# Patient Record
Sex: MALE | Race: WHITE
[De-identification: names, ages, dates, MRNs, and addresses within clinical notes are randomized per-mention and may not be internally consistent; named-entity substitution may affect disease eponyms.]

---

## 2019-06-23 ENCOUNTER — HOSPITAL ENCOUNTER (EMERGENCY)
Dept: HOSPITAL 11 - JP.ED | Age: 74
Discharge: NURSING, REHAB OR LONG-TERM CARE | End: 2019-06-23
Payer: MEDICARE

## 2019-06-23 DIAGNOSIS — I24.9: Primary | ICD-10-CM

## 2019-06-23 DIAGNOSIS — Z79.02: ICD-10-CM

## 2019-06-23 DIAGNOSIS — Z79.899: ICD-10-CM

## 2019-06-23 DIAGNOSIS — Z79.82: ICD-10-CM

## 2019-06-23 PROCEDURE — 85730 THROMBOPLASTIN TIME PARTIAL: CPT

## 2019-06-23 PROCEDURE — 84484 ASSAY OF TROPONIN QUANT: CPT

## 2019-06-23 PROCEDURE — 96365 THER/PROPH/DIAG IV INF INIT: CPT

## 2019-06-23 PROCEDURE — 93005 ELECTROCARDIOGRAM TRACING: CPT

## 2019-06-23 PROCEDURE — 85025 COMPLETE CBC W/AUTO DIFF WBC: CPT

## 2019-06-23 PROCEDURE — 96376 TX/PRO/DX INJ SAME DRUG ADON: CPT

## 2019-06-23 PROCEDURE — 36415 COLL VENOUS BLD VENIPUNCTURE: CPT

## 2019-06-23 PROCEDURE — 96375 TX/PRO/DX INJ NEW DRUG ADDON: CPT

## 2019-06-23 PROCEDURE — 96368 THER/DIAG CONCURRENT INF: CPT

## 2019-06-23 PROCEDURE — 99284 EMERGENCY DEPT VISIT MOD MDM: CPT

## 2019-06-23 PROCEDURE — 80053 COMPREHEN METABOLIC PANEL: CPT

## 2019-06-23 NOTE — EDM.PDOC
ED HPI GENERAL MEDICAL PROBLEM





- General


Chief Complaint: Chest Pain


Stated Complaint: CHEST PAINS


Time Seen by Provider: 06/23/19 07:54


Source of Information: Reports: Patient


History Limitations: Reports: No Limitations





- History of Present Illness


INITIAL COMMENTS - FREE TEXT/NARRATIVE: 


73-year-old white male comes in with chest pain. He's had 5 stents in the past 

that is 3 or 4 different episodes last time was 2012 at [facility]. For the 

past 2 or 3 days he's been having episodes of chest pain and this morning he 

got out of the shower and it was quite a bit worse. He took 2 nitroglycerin 

tablets on the way in. It decreased the pain a little bit now down to about 6 

out of 10. He feels a little bit short of breath sometimes no nausea no sweats. 

He's anon smoker no significant family history of heart disease. His wife says 

his heart rate is always just a little bit on the low side.





  ** Left Chest


Pain Score (Numeric/FACES): 8





- Related Data


 Allergies











Allergy/AdvReac Type Severity Reaction Status Date / Time


 


No Known Allergies Allergy   Verified 06/23/19 07:49














Past Medical History


Cardiovascular History: Reports: Stents





- Infectious Disease History


Infectious Disease History: Reports: Chicken Pox





Social & Family History





- Tobacco Use


Smoking Status *Q: Unknown Ever Smoked





ED ROS GENERAL





- Review of Systems


Review Of Systems: ROS reveals no pertinent complaints other than HPI.





ED EXAM, GENERAL





- Physical Exam


Exam: See Below


Exam Limited By: No Limitations


General Appearance: Alert, WD/WN, Mild Distress


Eye Exam: Bilateral Eye: Normal Inspection


Throat/Mouth: Normal Inspection


Neck: Normal Inspection


Respiratory/Chest: Lungs Clear


Cardiovascular: Normal Peripheral Pulses, Regular Rate, Rhythm, No Murmur


GI/Abdominal: Non-Tender


Extremities: Normal Inspection, No Pedal Edema


Neurological: Alert, Oriented, Normal Cognition


Psychiatric: Normal Affect


Skin Exam: Warm, Dry





Course





- Vital Signs


Last Recorded V/S: 





 Last Vital Signs











Temp  35.9 C   06/23/19 07:37


 


Pulse  59 L  06/23/19 07:37


 


Resp  16   06/23/19 07:37


 


BP  153/79 H  06/23/19 07:37


 


Pulse Ox  95   06/23/19 07:37














- Orders/Labs/Meds


Orders: 





 Active Orders 24 hr











 Category Date Time Status


 


 EKG Documentation Completion [RC] ASDIRECTED Care  06/23/19 08:00 Active


 


 Heparin Sodium/D5W [Heparin 25,000 Units in D5W 500 ML] Med  06/23/19 08:15 

Active





 25,000 units in 500 ml IV TITRATE   


 


 Nitroglycerin/D5W [Nitroglycerin  25 MG/D5W 250 ML] Med  06/23/19 08:00 Active





 25 mg in 250 ml IV TITRATE   


 


 Sodium Chloride 0.9% [Saline Flush] Med  06/23/19 08:00 Active





 10 ml FLUSH ASDIRECTED PRN   


 


 Saline Lock Insert [OM.PC] Urgent Oth  06/23/19 08:00 Ordered


 


 EKG 12 Lead [EK] Urgent Ther  06/23/19 08:00 Ordered








 Medication Orders





Nitroglycerin/Dextrose (Nitroglycerin  25 Mg/D5w 250 Ml)  25 mg in 250 mls @ 6 

mls/hr IV TITRATE [facility]; Protocol


   Last Admin: 06/23/19 08:01  Dose: 10 mcg/min, 6 mls/hr


Heparin Sodium/Dextrose (Heparin 25,000 Units In D5w 500 Ml)  25,000 units in 

500 mls @ 20 mls/hr IV TITRATE [facility]; Protocol


   Last Admin: 06/23/19 08:22  Dose: 1,000 units/hr, 20 mls/hr


Sodium Chloride (Saline Flush)  10 ml FLUSH ASDIRECTED PRN


   PRN Reason: Keep Vein Open








Labs: 





 Laboratory Tests











  06/23/19 06/23/19 06/23/19 Range/Units





  08:11 08:11 08:11 


 


WBC  7.1    (4.5-11.0)  K/uL


 


RBC  4.75    (4.30-5.90)  M/uL


 


Hgb  15.4 H    (12.0-15.0)  g/dL


 


Hct  44.3    (40.0-54.0)  %


 


MCV  93    (80-98)  fL


 


MCH  32 H    (27-31)  pg


 


MCHC  35    (32-36)  %


 


Plt Count  211    (150-400)  K/uL


 


Neut % (Auto)  70 H    (36-66)  %


 


Lymph % (Auto)  20 L    (24-44)  %


 


Mono % (Auto)  9 H    (2-6)  %


 


Eos % (Auto)  1 L    (2-4)  %


 


Baso % (Auto)  0    (0-1)  %


 


APTT    24.9 L  (27.0-36.0)  sec


 


Sodium   144   (140-148)  mmol/L


 


Potassium   3.9   (3.6-5.2)  mmol/L


 


Chloride   108   (100-108)  mmol/L


 


Carbon Dioxide   25   (21-32)  mmol/L


 


Anion Gap   10.7   (5.0-14.0)  mmol/L


 


BUN   21 H   (7-18)  mg/dL


 


Creatinine   1.0   (0.8-1.3)  mg/dL


 


Est Cr Clr Drug Dosing   68.94   mL/min


 


Estimated GFR (MDRD)   > 60   (>60)  


 


Glucose   108 H   ()  mg/dL


 


Calcium   9.1   (8.5-10.1)  mg/dL


 


Total Bilirubin   0.7   (0.2-1.0)  mg/dL


 


AST   17   (15-37)  U/L


 


ALT   34   (12-78)  U/L


 


Alkaline Phosphatase   58   ()  U/L


 


Troponin I   < 0.017   (0.000-0.056)  ng/mL


 


Total Protein   6.8   (6.4-8.2)  g/dL


 


Albumin   3.9   (3.4-5.0)  g/dL


 


Globulin   2.9   (2.3-3.5)  g/dL


 


Albumin/Globulin Ratio   1.3   (1.2-2.2)  











Meds: 





Medications











Generic Name Dose Route Start Last Admin





  Trade Name Freq  PRN Reason Stop Dose Admin


 


Nitroglycerin/Dextrose  25 mg in 250 mls @ 6 mls/hr  06/23/19 08:00  06/23/19 08

:01





  Nitroglycerin  25 Mg/D5w 250 Ml  IV   10 mcg/min





  TITRATE [facility]   6 mls/hr





     Administration





     





  Protocol   





  10 MCG/MIN   


 


Heparin Sodium/Dextrose  25,000 units in 500 mls @ 20 mls/hr  06/23/19 08:15  06 /23/19 08:22





  Heparin 25,000 Units In D5w 500 Ml  IV   1,000 units/hr





  TITRATE [facility]   20 mls/hr





     Administration





     





  Protocol   





  1,000 UNITS/HR   


 


Sodium Chloride  10 ml  06/23/19 08:00  





  Saline Flush  FLUSH   





  ASDIRECTED PRN   





  Keep Vein Open   





     





     





     














Discontinued Medications














Generic Name Dose Route Start Last Admin





  Trade Name Freq  PRN Reason Stop Dose Admin


 


Aspirin  325 mg  06/23/19 07:48  06/23/19 07:53





  Aspirin  PO  06/23/19 07:49  325 mg





  ONETIME ONE   Administration





     





     





     





     


 


Clopidogrel Bisulfate  600 mg  06/23/19 08:01  06/23/19 08:11





  Plavix  PO  06/23/19 08:02  600 mg





  ONETIME ONE   Administration





     





     





     





     


 


Heparin Sodium (Porcine)  4,000 units  06/23/19 08:01  06/23/19 08:10





  Heparin Sodium  IVPUSH  06/23/19 08:02  4,000 units





  ONETIME ONE   Administration





     





     





     





     


 


Lorazepam  1 mg  06/23/19 08:06  06/23/19 08:13





  Ativan  IVPUSH  06/23/19 08:07  1 mg





  ONETIME ONE   Administration





     





     





     





     


 


Morphine Sulfate  4 mg  06/23/19 07:48  06/23/19 08:01





  Morphine  IVPUSH  06/23/19 07:49  4 mg





  ONETIME ONE   Administration





     





     





     





     


 


Morphine Sulfate  4 mg  06/23/19 08:06  06/23/19 08:14





  Morphine  IVPUSH  06/23/19 08:07  4 mg





  ONETIME ONE   Administration





     





     





     





     


 


Morphine Sulfate  4 mg  06/23/19 08:22  06/23/19 08:26





  Morphine  IVPUSH  06/23/19 08:23  4 mg





  ONETIME ONE   Administration





     





     





     





     


 


Morphine Sulfate  Confirm  06/23/19 08:25  





  Morphine  Administered  06/23/19 08:26  





  Dose   





  4 mg   





  .ROUTE   





  .[facility]   





     





     





     





     














- Re-Assessments/Exams


Free Text/Narrative Re-Assessment/Exam: 





06/23/19 08:40


CP down to 3/10 after 4mg x 3 Morphine. Increasing ntg to 20.  Bp 160's.








06/23/19 0845 An EKG showed a normal sinus rhythm partial right bundle branch 

block. No old EKGs.





We started off with aspirin and a little bit difficulty starting an IV once we 

got it he was given IV morphine and started a nitroglycerin drip. I spoke with 

Dr. Horne at [facility] in [place] and he has accepted the patient will go ahead and 

proceed with heparin bolus and drip and loaded with Plavix 600 mg. At present 

time the patient's pain is as noted above. He's being loaded up for transfer now








Departure





- Departure


Time of Disposition: 08:47


Disposition: DC/Tfer to [facility]


Reason for Transfer *Q: Primary PCI Indicated


Condition: Serious


Clinical Impression: 


 Acute coronary syndrome





Referrals: 


PCP,None [Primary Care Provider] - 





- My Orders


Last 24 Hours: 





My Active Orders





06/23/19 08:00


EKG Documentation Completion [RC] ASDIRECTED 


Nitroglycerin/D5W [Nitroglycerin  25 MG/D5W 250 ML] 25 mg in 250 ml IV TITRATE 


Sodium Chloride 0.9% [Saline Flush]   10 ml FLUSH ASDIRECTED PRN 


Saline Lock Insert [OM.PC] Urgent 


EKG 12 Lead [EK] Urgent 





06/23/19 08:15


Heparin Sodium/D5W [Heparin 25,000 Units in D5W 500 ML] 25,000 units in 500 ml 

IV TITRATE 














- Assessment/Plan


Last 24 Hours: 





My Active Orders





06/23/19 08:00


EKG Documentation Completion [RC] ASDIRECTED 


Nitroglycerin/D5W [Nitroglycerin  25 MG/D5W 250 ML] 25 mg in 250 ml IV TITRATE 


Sodium Chloride 0.9% [Saline Flush]   10 ml FLUSH ASDIRECTED PRN 


Saline Lock Insert [OM.PC] Urgent 


EKG 12 Lead [EK] Urgent 





06/23/19 08:15


Heparin Sodium/D5W [Heparin 25,000 Units in D5W 500 ML] 25,000 units in 500 ml 

IV TITRATE